# Patient Record
Sex: MALE | Race: ASIAN | NOT HISPANIC OR LATINO | Employment: FULL TIME | URBAN - METROPOLITAN AREA
[De-identification: names, ages, dates, MRNs, and addresses within clinical notes are randomized per-mention and may not be internally consistent; named-entity substitution may affect disease eponyms.]

---

## 2018-02-13 ENCOUNTER — DOCUMENTATION ONLY (OUTPATIENT)
Dept: LAB | Facility: CLINIC | Age: 28
End: 2018-02-13

## 2018-02-13 NOTE — PROGRESS NOTES
This patient has overdue labs. A letter was sent on 1/5/2018 and there has been no lab appointment made. If you still want these labs done, please have your care team contact the patient to make a lab appointment. Otherwise, please have the labs discontinued and close the encounter.    Thank you,  [facility]

## 2018-07-02 ENCOUNTER — OFFICE VISIT (OUTPATIENT)
Dept: FAMILY MEDICINE | Facility: CLINIC | Age: 28
End: 2018-07-02
Payer: COMMERCIAL

## 2018-07-02 VITALS
HEIGHT: 63 IN | TEMPERATURE: 98.2 F | SYSTOLIC BLOOD PRESSURE: 114 MMHG | BODY MASS INDEX: 23.04 KG/M2 | WEIGHT: 130 LBS | HEART RATE: 72 BPM | DIASTOLIC BLOOD PRESSURE: 68 MMHG

## 2018-07-02 DIAGNOSIS — Z13.1 SCREENING FOR DIABETES MELLITUS: ICD-10-CM

## 2018-07-02 DIAGNOSIS — Z13.220 SCREENING FOR HYPERLIPIDEMIA: ICD-10-CM

## 2018-07-02 DIAGNOSIS — Z00.00 ROUTINE HISTORY AND PHYSICAL EXAMINATION OF ADULT: Primary | ICD-10-CM

## 2018-07-02 DIAGNOSIS — Z83.3 FAMILY HISTORY OF DIABETES MELLITUS IN FATHER: ICD-10-CM

## 2018-07-02 LAB — HBA1C MFR BLD: 5.3 % (ref 0–5.6)

## 2018-07-02 PROCEDURE — 83036 HEMOGLOBIN GLYCOSYLATED A1C: CPT | Performed by: NURSE PRACTITIONER

## 2018-07-02 PROCEDURE — 99395 PREV VISIT EST AGE 18-39: CPT | Performed by: NURSE PRACTITIONER

## 2018-07-02 PROCEDURE — 80061 LIPID PANEL: CPT | Performed by: NURSE PRACTITIONER

## 2018-07-02 PROCEDURE — 36415 COLL VENOUS BLD VENIPUNCTURE: CPT | Performed by: NURSE PRACTITIONER

## 2018-07-02 NOTE — MR AVS SNAPSHOT
After Visit Summary   7/2/2018    Hitesh Andrea    MRN: [id number]           Patient Information     Date Of Birth          1990        Visit Information        Provider Department      7/2/2018 12:20 PM Shima Rivera NP [facility]        Today's Diagnoses     Routine history and physical examination of adult    -  1    Screening for hyperlipidemia        Screening for diabetes mellitus        Family history of diabetes mellitus in father          Care Instructions      Preventive Health Recommendations  Male Ages 26 - 39    Yearly exam:             See your health care provider every year in order to  o   Review health changes.   o   Discuss preventive care.    o   Review your medicines if your doctor has prescribed any.    You should be tested each year for STDs (sexually transmitted diseases), if you re at risk.     After age 35, talk to your provider about cholesterol testing. If you are at risk for heart disease, have your cholesterol tested at least every 5 years.     If you are at risk for diabetes, you should have a diabetes test (fasting glucose).  Shots: Get a flu shot each year. Get a tetanus shot every 10 years.     Nutrition:    Eat at least 5 servings of fruits and vegetables daily.     Eat whole-grain bread, whole-wheat pasta and brown rice instead of white grains and rice.     Get adequate Calcium and Vitamin D.     Lifestyle    Exercise for at least 150 minutes a week (30 minutes a day, 5 days a week). This will help you control your weight and prevent disease.     Limit alcohol to one drink per day.     No smoking.     Wear sunscreen to prevent skin cancer.     See your dentist every six months for an exam and cleaning.   [facility]   Discharged by : Verna Gates MA    Paper scripts provided to patient : no     If you have any questions regarding your visit please contact your care team:     Team Gold                Clinic Hours Telephone  Number     Dr. Lois Rojas  Shima Rivera, CNP 7am-7pm  Monday - Thursday   7am-5pm  Fridays  [phone]   (Appointment scheduling available 24/7)     RN Line  [phone] option 2     Urgent Care - [place] - 11am-9pm Monday-Friday Saturday-Sunday- 9am-5pm     [place] -   5pm-9pm Monday-Friday Saturday-Sunday- 9am-5pm    [phone] - [place]    [phone] - [place]       For a Price Quote for your services, please call our Consumer Price Line at [phone].     What options do I have for visits at the clinic other than the traditional office visit?     To expand how we care for you, many of our providers are utilizing electronic visits (e-visits) and telephone visits, when medically appropriate, for interactions with their patients rather than a visit in the clinic. We also offer nurse visits for many medical concerns. Just like any other service, we will bill your insurance company for this type of visit based on time spent on the phone with your provider. Not all insurance companies cover these visits. Please check with your medical insurance if this type of visit is covered. You will be responsible for any charges that are not paid by your insurance.   E-visits via [employer]: generally incur a $35.00 fee.     Telephone visits:  Time spent on the phone: *charged based on time that is spent on the phone in increments of 10 minutes. Estimated cost:   5-10 mins $30.00   11-20 mins. $59.00   21-30 mins. $85.00       Use Greencarthart (secure email communication and access to your chart) to send your primary care provider a message or make an appointment. Ask someone on your Team how to sign up for FIZZAt.     As always, Thank you for trusting us with your health care needs!      [facility]:    Scheduling Appointments  [facility] [place]  Call: [phone]    [facility]  Call: [phone]    [facility]  Call: [phone]    For Gastroenterology referrals   [facility]   Clinics and Surgery Center, 4th Floor   [address] [place] [address]   Appointments: [phone]    WHERE TO GO FOR CARE?  Clinic    Make an appointment if you:       Are sick (cold, cough, flu, sore throat, earache or in pain).       Have a small injury (sprain, small cut, burn or broken bone).       Need a physical exam, Pap smear, vaccine or prescription refill.       Have questions about your health or medicines.    To reach us:      Call [phone] ([phone]). Open 24 hours every day. (For counseling services, call [phone].)    Log into Lyks at LLamasoft. (Visit BView to create an account.) Hospital emergency room    An emergency is a serious or life- threatening problem that must be treated right away.    Call [phone] or get to the hospital if you have:      Very bad or sudden:            - Chest pain or pressure         - Bleeding         - Head or belly pain         - Dizziness or trouble seeing, walking or                          Speaking      Problems breathing      Blood in your vomit or you are coughing up blood      A major injury (knocked out, loss of a finger or limb, rape, broken bone protruding from skin)    A mental health crisis. (Or call the Mental Health Crisis line at 1-951.356.4129 or Suicide Prevention Hotline at 1-410.793.1703.)    Open 24 hours every day. You don't need an appointment.     Urgent care    Visit urgent care for sickness or small injuries when the clinic is closed. You don't need an appointment. To check hours or find an urgent care near you, visit [email or web address]. Online care    Get online care from [facility] for more than 70 common problems, like colds, allergies and infections. Open 24 hours every day at:   [email or web address]   Need help deciding?    For advice about  "where to be seen, you may call your clinic and ask to speak with a nurse. We're here for you 24 hours every day.         If you are deaf or hard of hearing, please let us know. We provide many free services including sign language interpreters, oral interpreters, TTYs, telephone amplifiers, note takers and written materials.                   Follow-ups after your visit        Who to contact     If you have questions or need follow up information about today's clinic visit or your schedule please contact [facility] directly at [phone].  Normal or non-critical lab and imaging results will be communicated to you by Fruition Partnershart, letter or phone within 4 business days after the clinic has received the results. If you do not hear from us within 7 days, please contact the clinic through [employer] or phone. If you have a critical or abnormal lab result, we will notify you by phone as soon as possible.  Submit refill requests through el? or call your pharmacy and they will forward the refill request to us. Please allow 3 business days for your refill to be completed.          Additional Information About Your Visit        [employer] Information     el? gives you secure access to your electronic health record. If you see a primary care provider, you can also send messages to your care team and make appointments. If you have questions, please call your primary care clinic.  If you do not have a primary care provider, please call [phone] and they will assist you.        Care EveryWhere ID     This is your Care EveryWhere ID. This could be used by other organizations to access your [place] medical records  [id number]        Your Vitals Were     Pulse Temperature Height BMI (Body Mass Index)          72 98.2  F (36.8  C) (Oral) 5' 3.25\" (1.607 m) 22.85 kg/m2         Blood Pressure from Last 3 Encounters:   07/02/18 114/68   12/30/16 120/68    Weight from Last 3 Encounters:   07/02/18 130 lb (59 kg) "   12/30/16 130 lb 3.2 oz (59.1 kg)              We Performed the Following     Hemoglobin A1c     Lipid Profile (Chol, Trig, HDL, LDL calc)        Primary Care Provider Fax #    Physician No Ref-Primary [phone]       No address on file        Equal Access to Services     RAAD HILARIA : Hadii ros robb milyo Soomaali, waaxda luqadaha, qaybta kaalmada adeegyada, camden jocelynein hayaaluís javiercammie hager dipti mccollum. So [facility] [phone].    ATENCIÓN: Si habla español, tiene a hatfield disposición servicios gratuitos de asistencia lingüística. Llame al [phone].    We comply with applicable federal civil rights laws and [place] laws. We do not discriminate on the basis of race, color, national origin, age, disability, sex, sexual orientation, or gender identity.            Thank you!     Thank you for choosing [facility]  for your care. Our goal is always to provide you with excellent care. Hearing back from our patients is one way we can continue to improve our services. Please take a few minutes to complete the written survey that you may receive in the mail after your visit with us. Thank you!             Your Updated Medication List - Protect others around you: Learn how to safely use, store and throw away your medicines at www.disposemymeds.org.          This list is accurate as of 7/2/18  1:14 PM.  Always use your most recent med list.                   Brand Name Dispense Instructions for use Diagnosis    MULTI-VITAMIN GUMMIES PO      Take by mouth daily

## 2018-07-02 NOTE — PATIENT INSTRUCTIONS
Preventive Health Recommendations  Male Ages 26 - 39    Yearly exam:             See your health care provider every year in order to  o   Review health changes.   o   Discuss preventive care.    o   Review your medicines if your doctor has prescribed any.    You should be tested each year for STDs (sexually transmitted diseases), if you re at risk.     After age 35, talk to your provider about cholesterol testing. If you are at risk for heart disease, have your cholesterol tested at least every 5 years.     If you are at risk for diabetes, you should have a diabetes test (fasting glucose).  Shots: Get a flu shot each year. Get a tetanus shot every 10 years.     Nutrition:    Eat at least 5 servings of fruits and vegetables daily.     Eat whole-grain bread, whole-wheat pasta and brown rice instead of white grains and rice.     Get adequate Calcium and Vitamin D.     Lifestyle    Exercise for at least 150 minutes a week (30 minutes a day, 5 days a week). This will help you control your weight and prevent disease.     Limit alcohol to one drink per day.     No smoking.     Wear sunscreen to prevent skin cancer.     See your dentist every six months for an exam and cleaning.   [facility]   Discharged by : Verna Gates MA    Paper scripts provided to patient : no     If you have any questions regarding your visit please contact your care team:     Team Gold                Clinic Hours Telephone Number     Dr. Lois Rivera, CNP 7am-7pm  Monday - Thursday   7am-5pm  Fridays  [phone]   (Appointment scheduling available 24/7)     RN Line  [phone] option 2     Urgent Care - [place] - 11am-9pm Monday-Friday Saturday-Sunday- 9am-5pm     [place] -   5pm-9pm Monday-Friday Saturday-Sunday- 9am-5pm    [phone] - [place]    [phone] - [place]       For a Price Quote for your services,  please call our Consumer Price Line at [phone].     What options do I have for visits at the clinic other than the traditional office visit?     To expand how we care for you, many of our providers are utilizing electronic visits (e-visits) and telephone visits, when medically appropriate, for interactions with their patients rather than a visit in the clinic. We also offer nurse visits for many medical concerns. Just like any other service, we will bill your insurance company for this type of visit based on time spent on the phone with your provider. Not all insurance companies cover these visits. Please check with your medical insurance if this type of visit is covered. You will be responsible for any charges that are not paid by your insurance.   E-visits via liveBooks: generally incur a $35.00 fee.     Telephone visits:  Time spent on the phone: *charged based on time that is spent on the phone in increments of 10 minutes. Estimated cost:   5-10 mins $30.00   11-20 mins. $59.00   21-30 mins. $85.00       Use liveBooks (secure email communication and access to your chart) to send your primary care provider a message or make an appointment. Ask someone on your Team how to sign up for liveBooks.     As always, Thank you for trusting us with your health care needs!      [facility]:    Scheduling Appointments  [place]  Call: [phone]    [facility]  Call: [phone]    [facility]  Call: [phone]    For Gastroenterology referrals   [facility]   Clinics and Surgery Center, 4th Floor   [address] [place] [address]   Appointments: [phone]    WHERE TO GO FOR CARE?  Clinic    Make an appointment if you:       Are sick (cold, cough, flu, sore throat, earache or in pain).       Have a small injury (sprain, small cut, burn or broken bone).       Need a physical exam, Pap smear, vaccine or  prescription refill.       Have questions about your health or medicines.    To reach us:      Call [phone] ([phone]). Open 24 hours every day. (For counseling services, call [phone].)    Log into farmflo at InfoLogix. (Visit [email or web address] to create an account.) Hospital emergency room    An emergency is a serious or life- threatening problem that must be treated right away.    Call 911 or get to the hospital if you have:      Very bad or sudden:            - Chest pain or pressure         - Bleeding         - Head or belly pain         - Dizziness or trouble seeing, walking or                          Speaking      Problems breathing      Blood in your vomit or you are coughing up blood      A major injury (knocked out, loss of a finger or limb, rape, broken bone protruding from skin)    A mental health crisis. (Or call the Mental Health Crisis line at 1-780.840.8123 or Suicide Prevention Hotline at 1-889.473.6915.)    Open 24 hours every day. You don't need an appointment.     Urgent care    Visit urgent care for sickness or small injuries when the clinic is closed. You don't need an appointment. To check hours or find an urgent care near you, visit [email or web address]. Online care    Get online [facility] from [facility] for more than 70 common problems, like colds, allergies and infections. Open 24 hours every day at:   [email or web address]   Need help deciding?    For advice about where to be seen, you may call your clinic and ask to speak with a nurse. We're here for you 24 hours every day.         If you are deaf or hard of hearing, please let us know. We provide many free services including sign language interpreters, oral interpreters, TTYs, telephone amplifiers, note takers and written materials.

## 2018-07-02 NOTE — PROGRESS NOTES
SUBJECTIVE:   CC: Hitesh Andrea is an 27 year old male who presents for preventative health visit.     Physical   Annual:     Getting at least 3 servings of Calcium per day:  NO    Bi-annual eye exam:  Yes    Dental care twice a year:  NO    Sleep apnea or symptoms of sleep apnea:  None    Diet:  Breakfast skipped    Frequency of exercise:  6-7 days/week    Duration of exercise:  45-60 minutes    Taking medications regularly:  Yes    Medication side effects:  None    Additional concerns today:  No    Lifts weights and runs for exercise.    Thinning hair. Noticed when he increased working out and exercise.  No bald spots.    Finished school, master's in educational leadership.  Works in employment counseling for 1 year.    Diet:  Meat is mainly chicken.  Beef occasional.  Eats twice daily.  Meal replacement drinks in between meals.  Not much rice or fruits.    Today's PHQ-2 Score:   PHQ-2 ( 1999 Pfizer) 7/2/2018   Q1: Little interest or pleasure in doing things 0   Q2: Feeling down, depressed or hopeless 0   PHQ-2 Score 0   Q1: Little interest or pleasure in doing things Not at all   Q2: Feeling down, depressed or hopeless Not at all   PHQ-2 Score 0       Abuse: Current or Past(Physical, Sexual or Emotional)- No  Do you feel safe in your environment - Yes    Social History   Substance Use Topics     Smoking status: Never Smoker     Smokeless tobacco: Never Used     Alcohol use 0.0 oz/week     0 Standard drinks or equivalent per week      Comment: socailly     Alcohol Use 7/2/2018   If you drink alcohol do you typically have greater than 3 drinks per day OR greater than 7 drinks per week? No   No flowsheet data found.    Last PSA: No results found for: PSA    Reviewed orders with patient. Reviewed health maintenance and updated orders accordingly - Yes  BP Readings from Last 3 Encounters:   07/02/18 114/68   12/30/16 120/68    Wt Readings from Last 3 Encounters:   07/02/18 130 lb (59 kg)   12/30/16 130 lb 3.2 oz (59.1 kg)  "                 Patient Active Problem List   Diagnosis     Family history of diabetes mellitus in father     Past Surgical History:   Procedure Laterality Date     eye surgery Bilateral     Lasik eye surgery       Social History   Substance Use Topics     Smoking status: Never Smoker     Smokeless tobacco: Never Used     Alcohol use 0.0 oz/week     0 Standard drinks or equivalent per week      Comment: socailly     Family History   Problem Relation Age of Onset     Mental Illness Mother      Diabetes Father 60     No Known Problems Brother      No Known Problems Sister      No Known Problems Brother      No Known Problems Sister      HEART DISEASE No family hx of          Current Outpatient Prescriptions   Medication Sig Dispense Refill     Multiple Vitamins-Minerals (MULTI-VITAMIN GUMMIES PO) Take by mouth daily        No Known Allergies    Reviewed and updated as needed this visit by clinical staff  Tobacco  Allergies  Meds  Problems  Med Hx  Surg Hx  Fam Hx  Soc Hx          Reviewed and updated as needed this visit by Provider  Allergies  Meds  Problems            Review of Systems  CONSTITUTIONAL: NEGATIVE for fever, chills, change in weight  INTEGUMENTARY/SKIN: hair loss  EYES: NEGATIVE for vision changes or irritation  ENT: NEGATIVE for ear, mouth and throat problems  RESP: NEGATIVE for significant cough or SOB  CV: NEGATIVE for chest pain, palpitations or peripheral edema  GI: NEGATIVE for nausea, abdominal pain, heartburn, or change in bowel habits   male: negative for dysuria, hematuria, decreased urinary stream, erectile dysfunction, urethral discharge  MUSCULOSKELETAL: NEGATIVE for significant arthralgias or myalgia  NEURO: NEGATIVE for weakness, dizziness or paresthesias  PSYCHIATRIC: NEGATIVE for changes in mood or affect    OBJECTIVE:   /68  Pulse 72  Temp 98.2  F (36.8  C) (Oral)  Ht 5' 3.25\" (1.607 m)  Wt 130 lb (59 kg)  BMI 22.85 kg/m2    Physical Exam  GENERAL: healthy, " "alert and no distress  EYES: Eyes grossly normal to inspection, PERRL and conjunctivae and sclerae normal  HENT: ear canals and TM's normal, nose and mouth without ulcers or lesions  NECK: no adenopathy, no asymmetry, masses, or scars and thyroid normal to palpation  RESP: lungs clear to auscultation - no rales, rhonchi or wheezes  CV: regular rate and rhythm, normal S1 S2, no S3 or S4, no murmur, click or rub, no peripheral edema and peripheral pulses strong  ABDOMEN: soft, nontender, no hepatosplenomegaly, no masses and bowel sounds normal   (male): declined  MS: no gross musculoskeletal defects noted, no edema  SKIN: no suspicious lesions or rashes  NEURO: Normal strength and tone, mentation intact and speech normal  PSYCH: mentation appears normal, affect normal/bright    Diagnostic Test Results:  Results for orders placed or performed in visit on 07/02/18   Lipid Profile (Chol, Trig, HDL, LDL calc)   Result Value Ref Range    Cholesterol 171 <200 mg/dL    Triglycerides 93 <150 mg/dL    HDL Cholesterol 77 >39 mg/dL    LDL Cholesterol Calculated 75 <100 mg/dL    Non HDL Cholesterol 94 <130 mg/dL   Hemoglobin A1c   Result Value Ref Range    Hemoglobin A1C 5.3 0 - 5.6 %       ASSESSMENT/PLAN:   1. Routine history and physical examination of adult    2. Screening for hyperlipidemia    - Lipid Profile (Chol, Trig, HDL, LDL calc)    3. Screening for diabetes mellitus    - Hemoglobin A1c    4. Family history of diabetes mellitus in father        COUNSELING:   Reviewed preventive health counseling, as reflected in patient instructions       Regular exercise       Healthy diet/nutrition    BP Readings from Last 1 Encounters:   07/02/18 114/68     Estimated body mass index is 22.85 kg/(m^2) as calculated from the following:    Height as of this encounter: 5' 3.25\" (1.607 m).    Weight as of this encounter: 130 lb (59 kg).           reports that he has never smoked. He has never used smokeless tobacco.      Counseling " Resources:  ATP IV Guidelines  Pooled Cohorts Equation Calculator  FRAX Risk Assessment  ICSI Preventive Guidelines  Dietary Guidelines for Americans, 2010  USDA's MyPlate  ASA Prophylaxis  Lung CA Screening    Shima Rivera NP  [facility]

## 2018-07-03 LAB
CHOLEST SERPL-MCNC: 171 MG/DL
HDLC SERPL-MCNC: 77 MG/DL
LDLC SERPL CALC-MCNC: 75 MG/DL
NONHDLC SERPL-MCNC: 94 MG/DL
TRIGL SERPL-MCNC: 93 MG/DL

## 2018-10-02 ENCOUNTER — OFFICE VISIT (OUTPATIENT)
Dept: FAMILY MEDICINE | Facility: CLINIC | Age: 28
End: 2018-10-02

## 2018-10-02 VITALS
HEART RATE: 68 BPM | SYSTOLIC BLOOD PRESSURE: 110 MMHG | DIASTOLIC BLOOD PRESSURE: 64 MMHG | BODY MASS INDEX: 24.63 KG/M2 | TEMPERATURE: 98.4 F | HEIGHT: 63 IN | WEIGHT: 139 LBS

## 2018-10-02 DIAGNOSIS — H57.89 IRRITATION OF LEFT EYE: Primary | ICD-10-CM

## 2018-10-02 DIAGNOSIS — Z23 NEED FOR PROPHYLACTIC VACCINATION AND INOCULATION AGAINST INFLUENZA: ICD-10-CM

## 2018-10-02 PROCEDURE — 99213 OFFICE O/P EST LOW 20 MIN: CPT | Mod: 25 | Performed by: PHYSICIAN ASSISTANT

## 2018-10-02 PROCEDURE — 90471 IMMUNIZATION ADMIN: CPT | Performed by: PHYSICIAN ASSISTANT

## 2018-10-02 PROCEDURE — 90686 IIV4 VACC NO PRSV 0.5 ML IM: CPT | Performed by: PHYSICIAN ASSISTANT

## 2018-10-02 RX ORDER — TOBRAMYCIN 3 MG/ML
1 SOLUTION/ DROPS OPHTHALMIC EVERY 4 HOURS
Qty: 1 BOTTLE | Refills: 0 | Status: SHIPPED | OUTPATIENT
Start: 2018-10-02 | End: 2018-10-09

## 2018-10-02 NOTE — MR AVS SNAPSHOT
After Visit Summary   10/2/2018    Hitesh Andrea    MRN: [id number]           Patient Information     Date Of Birth          1990        Visit Information        Provider Department      10/2/2018 11:20 AM Isauro Reyna PA-C [facility]        Today's Diagnoses     Irritation of left eye    -  1    Need for prophylactic vaccination and inoculation against influenza          Care Instructions    1. Do warm/hot packing twice a day x a few days  2. Topical antibiotic   3. Contact us Thursday or Friday if persisting           Follow-ups after your visit        Who to contact     If you have questions or need follow up information about today's clinic visit or your schedule please contact [facility] directly at [phone].  Normal or non-critical lab and imaging results will be communicated to you by [facility], letter or phone within 4 business days after the clinic has received the results. If you do not hear from us within 7 days, please contact the clinic through [facility] or phone. If you have a critical or abnormal lab result, we will notify you by phone as soon as possible.  Submit refill requests through [facility] or call your pharmacy and they will forward the refill request to us. Please allow 3 business days for your refill to be completed.          Additional Information About Your Visit        MyChart Information     [employer] gives you secure access to your electronic health record. If you see a primary care provider, you can also send messages to your care team and make appointments. If you have questions, please call your primary care clinic.  If you do not have a primary care provider, please call [phone] and they will assist you.        Care EveryWhere ID     This is your Care EveryWhere ID. This could be used by other organizations to access your [facility] medical records  [id number]        Your Vitals Were     Pulse Temperature Height BMI (Body  "Mass Index)          68 98.4  F (36.9  C) (Oral) 5' 3.25\" (1.607 m) 24.43 kg/m2         Blood Pressure from Last 3 Encounters:   10/02/18 110/64   07/02/18 114/68   12/30/16 120/68    Weight from Last 3 Encounters:   10/02/18 139 lb (63 kg)   07/02/18 130 lb (59 kg)   12/30/16 130 lb 3.2 oz (59.1 kg)              We Performed the Following     FLU VACCINE, SPLIT VIRUS, IM (QUADRIVALENT) [27986]- >3 YRS     Vaccine Administration, Initial [70973]          Today's Medication Changes          These changes are accurate as of 10/2/18 11:49 AM.  If you have any questions, ask your nurse or doctor.               Start taking these medicines.        Dose/Directions    tobramycin 0.3 % ophthalmic solution   Commonly known as:  TOBREX   Used for:  Irritation of left eye   Started by:  Isauro Reyna PA-C        Dose:  1 drop   Apply 1 drop to eye every 4 hours for 7 days   Quantity:  1 Bottle   Refills:  0            Where to get your medicines      These medications were sent to [facility] [address] [place] [id number]     Phone:  [phone]     tobramycin 0.3 % ophthalmic solution                Primary Care Provider Fax #    Physician No Ref-Primary [phone]       No address on file        Equal Access to Services     RAAD MANRIQUE AH: Hadii ros minoro Sodai, waaxda luqadaha, qaybta kaalmada adeegyada, camden mccollum. So [facility] [phone].    ATENCIÓN: Si habla español, tiene a hatfield disposición servicios gratuitos de asistencia lingüística. Llame al [phone].    We comply with applicable federal civil rights laws and [place] laws. We do not discriminate on the basis of race, color, national origin, age, disability, sex, sexual orientation, or gender identity.            Thank you!     Thank you for choosing [facility]  for your care. Our goal is always to provide you with excellent care. " Hearing back from our patients is one way we can continue to improve our services. Please take a few minutes to complete the written survey that you may receive in the mail after your visit with us. Thank you!             Your Updated Medication List - Protect others around you: Learn how to safely use, store and throw away your medicines at www.disposemymeds.org.          This list is accurate as of 10/2/18 11:49 AM.  Always use your most recent med list.                   Brand Name Dispense Instructions for use Diagnosis    MULTI-VITAMIN GUMMIES PO      Take by mouth daily        tobramycin 0.3 % ophthalmic solution    TOBREX    1 Bottle    Apply 1 drop to eye every 4 hours for 7 days    Irritation of left eye

## 2018-10-02 NOTE — PROGRESS NOTES
"  SUBJECTIVE:   Hitesh Andrea is a 28 year old male who presents to clinic today for the following health issues:    Eye(s) Problem  Onset: 3 days     Description:   Location: left   Pain: no - he can feel something under his eye lid. When he closes his eyes, he can feel it more.   Redness: YES    Accompanying Signs & Symptoms:  Discharge/mattering: no   Swelling: no   Visual changes: no   Fever: no   Nasal Congestion: no   Bothered by bright lights: no     History:   Trauma: no   Foreign body exposure: no     Precipitating factors:   Wearing contacts: no    Alleviating factors:  Improved by: None     Therapies Tried and outcome: None     Denies a known foreign body. Reports mild irritation but no pain or vision changes. He's tried flushing the eye.     Problem list and histories reviewed & adjusted, as indicated.  Additional history: as documented    Labs reviewed in EPIC    Reviewed and updated as needed this visit by clinical staff       Reviewed and updated as needed this visit by Provider         ROS:  Constitutional, HEENT, cardiovascular, pulmonary, gi and gu systems are negative, except as otherwise noted.    OBJECTIVE:     /64 (Cuff Size: Adult Regular)  Pulse 68  Temp 98.4  F (36.9  C) (Oral)  Ht 5' 3.25\" (1.607 m)  Wt 139 lb (63 kg)  BMI 24.43 kg/m2  Body mass index is 24.43 kg/(m^2).  GENERAL: healthy, alert and no distress  EYES: EOMS are intact and not painful. Has mild conjunctival erythema. He has no foreign body. Topical application of proparacaine results in resolved discomfort. Fluorescein staining is normal without corneal injury found.     ASSESSMENT/PLAN:       ICD-10-CM    1. Irritation of left eye H57.89 tobramycin (TOBREX) 0.3 % ophthalmic solution   2. Need for prophylactic vaccination and inoculation against influenza Z23 FLU VACCINE, SPLIT VIRUS, IM (QUADRIVALENT) [76151]- >3 YRS     Vaccine Administration, Initial [49227]      Exam is reassuring. He seems to possibly have an upper " eyelid stye based on his description though his exam is really quite reassuring.    Reviewed options - will treat with topical antibiotics, hot packing and he can contact me in a few days if getting worse / not improving.     VINCENT SHANKAR PA-C  [facility]    Injectable Influenza Immunization Documentation    1.  Is the person to be vaccinated sick today?   No    2. Does the person to be vaccinated have an allergy to a component   of the vaccine?   No  Egg Allergy Algorithm Link    3. Has the person to be vaccinated ever had a serious reaction   to influenza vaccine in the past?   No    4. Has the person to be vaccinated ever had Guillain-Barré syndrome?   No    Form completed by Merari Parikh CMA (AAMA)

## 2018-10-02 NOTE — PATIENT INSTRUCTIONS
1. Do warm/hot packing twice a day x a few days  2. Topical antibiotic   3. Contact us Thursday or Friday if persisting

## 2018-10-02 NOTE — NURSING NOTE
Prior to injection verified patient identity using patient's name and date of birth.  Due to injection administration, patient instructed to remain in clinic for 15 minutes  afterwards, and to report any adverse reaction to me immediately.    Merari Parikh CMA ([facility])

## 2020-02-10 ENCOUNTER — HEALTH MAINTENANCE LETTER (OUTPATIENT)
Age: 30
End: 2020-02-10

## 2020-11-14 ENCOUNTER — HEALTH MAINTENANCE LETTER (OUTPATIENT)
Age: 30
End: 2020-11-14

## 2021-03-28 ENCOUNTER — HEALTH MAINTENANCE LETTER (OUTPATIENT)
Age: 31
End: 2021-03-28

## 2021-09-12 ENCOUNTER — HEALTH MAINTENANCE LETTER (OUTPATIENT)
Age: 31
End: 2021-09-12

## 2022-04-24 ENCOUNTER — HEALTH MAINTENANCE LETTER (OUTPATIENT)
Age: 32
End: 2022-04-24

## 2022-09-29 ENCOUNTER — NURSE TRIAGE (OUTPATIENT)
Dept: NURSING | Facility: CLINIC | Age: 32
End: 2022-09-29

## 2022-09-29 NOTE — TELEPHONE ENCOUNTER
"Pt reports \"last night at a concert, [place], fell down, heard a popping noise, knew something was wrong, now knee swollen and hard to stand up straight\". Pt reports he is unable to bear weight on both feet.    Advised pt to be seen in clinic today, ice and elevate in the meantime, have another adult drive.     Pt verbalizes understanding and agrees to plan.     Reason for Disposition    Can't stand (bear weight) or walk    Additional Information    Negative: Major bleeding (actively dripping or spurting) that can't be stopped    Negative: Bullet, stabbed by knife or other serious penetrating wound    Negative: Looks like a dislocated joint (crooked or deformed)    Negative: Serious injury with multiple fractures (broken bones)    Negative: Sounds like a life-threatening emergency to the triager    Negative: Wound looks infected    Protocols used: KNEE INJURY-A-OH      "

## 2022-11-19 ENCOUNTER — HEALTH MAINTENANCE LETTER (OUTPATIENT)
Age: 32
End: 2022-11-19

## 2023-06-01 ENCOUNTER — HEALTH MAINTENANCE LETTER (OUTPATIENT)
Age: 33
End: 2023-06-01

## 2023-11-17 ENCOUNTER — APPOINTMENT (OUTPATIENT)
Dept: CT IMAGING | Facility: HOSPITAL | Age: 33
End: 2023-11-17
Attending: EMERGENCY MEDICINE
Payer: COMMERCIAL

## 2023-11-17 ENCOUNTER — HOSPITAL ENCOUNTER (EMERGENCY)
Facility: HOSPITAL | Age: 33
Discharge: HOME | End: 2023-11-18
Attending: EMERGENCY MEDICINE | Admitting: EMERGENCY MEDICINE
Payer: COMMERCIAL

## 2023-11-17 ENCOUNTER — APPOINTMENT (OUTPATIENT)
Dept: RADIOLOGY | Facility: HOSPITAL | Age: 33
End: 2023-11-17
Attending: EMERGENCY MEDICINE
Payer: COMMERCIAL

## 2023-11-17 VITALS
OXYGEN SATURATION: 100 % | SYSTOLIC BLOOD PRESSURE: 114 MMHG | HEIGHT: 64 IN | RESPIRATION RATE: 18 BRPM | HEART RATE: 90 BPM | WEIGHT: 140.1 LBS | BODY MASS INDEX: 23.92 KG/M2 | DIASTOLIC BLOOD PRESSURE: 55 MMHG | TEMPERATURE: 98 F

## 2023-11-17 DIAGNOSIS — S01.01XA SCALP LACERATION, INITIAL ENCOUNTER: ICD-10-CM

## 2023-11-17 DIAGNOSIS — R40.20 LOSS OF CONSCIOUSNESS (H): ICD-10-CM

## 2023-11-17 DIAGNOSIS — S01.81XA CHIN LACERATION, INITIAL ENCOUNTER: ICD-10-CM

## 2023-11-17 DIAGNOSIS — F10.920 ALCOHOLIC INTOXICATION WITHOUT COMPLICATION (H): ICD-10-CM

## 2023-11-17 DIAGNOSIS — S09.90XA INJURY OF HEAD, INITIAL ENCOUNTER: ICD-10-CM

## 2023-11-17 LAB
ANION GAP SERPL CALCULATED.3IONS-SCNC: 21 MMOL/L (ref 7–15)
BASOPHILS # BLD AUTO: 0.1 10E3/UL (ref 0–0.2)
BASOPHILS NFR BLD AUTO: 0 %
BUN SERPL-MCNC: 19.6 MG/DL (ref 6–20)
CALCIUM SERPL-MCNC: 9.8 MG/DL (ref 8.6–10)
CHLORIDE SERPL-SCNC: 100 MMOL/L (ref 98–107)
CREAT SERPL-MCNC: 1.04 MG/DL (ref 0.67–1.17)
D DIMER PPP FEU-MCNC: <0.27 UG/ML FEU (ref 0–0.5)
DEPRECATED HCO3 PLAS-SCNC: 20 MMOL/L (ref 22–29)
EGFRCR SERPLBLD CKD-EPI 2021: >90 ML/MIN/1.73M2
EOSINOPHIL # BLD AUTO: 0.1 10E3/UL (ref 0–0.7)
EOSINOPHIL NFR BLD AUTO: 1 %
ERYTHROCYTE [DISTWIDTH] IN BLOOD BY AUTOMATED COUNT: 13.2 % (ref 10–15)
ETHANOL SERPL-MCNC: 0.17 G/DL
GLUCOSE SERPL-MCNC: 110 MG/DL (ref 70–99)
HCT VFR BLD AUTO: 47.8 % (ref 40–53)
HGB BLD-MCNC: 16.3 G/DL (ref 13.3–17.7)
HOLD SPECIMEN: NORMAL
HOLD SPECIMEN: NORMAL
IMM GRANULOCYTES # BLD: 0.1 10E3/UL
IMM GRANULOCYTES NFR BLD: 1 %
LYMPHOCYTES # BLD AUTO: 4.7 10E3/UL (ref 0.8–5.3)
LYMPHOCYTES NFR BLD AUTO: 27 %
MCH RBC QN AUTO: 29.6 PG (ref 26.5–33)
MCHC RBC AUTO-ENTMCNC: 34.1 G/DL (ref 31.5–36.5)
MCV RBC AUTO: 87 FL (ref 78–100)
MONOCYTES # BLD AUTO: 0.9 10E3/UL (ref 0–1.3)
MONOCYTES NFR BLD AUTO: 5 %
NEUTROPHILS # BLD AUTO: 11.4 10E3/UL (ref 1.6–8.3)
NEUTROPHILS NFR BLD AUTO: 66 %
NRBC # BLD AUTO: 0 10E3/UL
NRBC BLD AUTO-RTO: 0 /100
PLATELET # BLD AUTO: 222 10E3/UL (ref 150–450)
POTASSIUM SERPL-SCNC: 3 MMOL/L (ref 3.4–5.3)
RBC # BLD AUTO: 5.51 10E6/UL (ref 4.4–5.9)
SODIUM SERPL-SCNC: 141 MMOL/L (ref 135–145)
TROPONIN T SERPL HS-MCNC: 7 NG/L
WBC # BLD AUTO: 17.3 10E3/UL (ref 4–11)

## 2023-11-17 PROCEDURE — 71046 X-RAY EXAM CHEST 2 VIEWS: CPT

## 2023-11-17 PROCEDURE — 36415 COLL VENOUS BLD VENIPUNCTURE: CPT | Performed by: EMERGENCY MEDICINE

## 2023-11-17 PROCEDURE — 12011 RPR F/E/E/N/L/M 2.5 CM/<: CPT

## 2023-11-17 PROCEDURE — 99285 EMERGENCY DEPT VISIT HI MDM: CPT | Mod: 25

## 2023-11-17 PROCEDURE — 96360 HYDRATION IV INFUSION INIT: CPT

## 2023-11-17 PROCEDURE — 85025 COMPLETE CBC W/AUTO DIFF WBC: CPT | Performed by: EMERGENCY MEDICINE

## 2023-11-17 PROCEDURE — 85379 FIBRIN DEGRADATION QUANT: CPT | Performed by: EMERGENCY MEDICINE

## 2023-11-17 PROCEDURE — 250N000011 HC RX IP 250 OP 636: Performed by: EMERGENCY MEDICINE

## 2023-11-17 PROCEDURE — 258N000003 HC RX IP 258 OP 636: Performed by: EMERGENCY MEDICINE

## 2023-11-17 PROCEDURE — 72125 CT NECK SPINE W/O DYE: CPT

## 2023-11-17 PROCEDURE — 90471 IMMUNIZATION ADMIN: CPT | Performed by: EMERGENCY MEDICINE

## 2023-11-17 PROCEDURE — 70450 CT HEAD/BRAIN W/O DYE: CPT

## 2023-11-17 PROCEDURE — 93005 ELECTROCARDIOGRAM TRACING: CPT | Performed by: EMERGENCY MEDICINE

## 2023-11-17 PROCEDURE — 12001 RPR S/N/AX/GEN/TRNK 2.5CM/<: CPT

## 2023-11-17 PROCEDURE — 90715 TDAP VACCINE 7 YRS/> IM: CPT | Performed by: EMERGENCY MEDICINE

## 2023-11-17 PROCEDURE — 82077 ASSAY SPEC XCP UR&BREATH IA: CPT | Performed by: EMERGENCY MEDICINE

## 2023-11-17 PROCEDURE — 80048 BASIC METABOLIC PNL TOTAL CA: CPT | Performed by: EMERGENCY MEDICINE

## 2023-11-17 PROCEDURE — 84484 ASSAY OF TROPONIN QUANT: CPT | Performed by: EMERGENCY MEDICINE

## 2023-11-17 PROCEDURE — 96361 HYDRATE IV INFUSION ADD-ON: CPT

## 2023-11-17 RX ORDER — POTASSIUM CHLORIDE 1500 MG/1
40 TABLET, EXTENDED RELEASE ORAL ONCE
Status: COMPLETED | OUTPATIENT
Start: 2023-11-17 | End: 2023-11-18

## 2023-11-17 RX ADMIN — CLOSTRIDIUM TETANI TOXOID ANTIGEN (FORMALDEHYDE INACTIVATED), CORYNEBACTERIUM DIPHTHERIAE TOXOID ANTIGEN (FORMALDEHYDE INACTIVATED), BORDETELLA PERTUSSIS TOXOID ANTIGEN (GLUTARALDEHYDE INACTIVATED), BORDETELLA PERTUSSIS FILAMENTOUS HEMAGGLUTININ ANTIGEN (FORMALDEHYDE INACTIVATED), BORDETELLA PERTUSSIS PERTACTIN ANTIGEN, AND BORDETELLA PERTUSSIS FIMBRIAE 2/3 ANTIGEN 0.5 ML: 5; 2; 2.5; 5; 3; 5 INJECTION, SUSPENSION INTRAMUSCULAR at 22:21

## 2023-11-17 RX ADMIN — SODIUM CHLORIDE 1000 ML: 9 INJECTION, SOLUTION INTRAVENOUS at 22:21

## 2023-11-17 ASSESSMENT — ACTIVITIES OF DAILY LIVING (ADL): ADLS_ACUITY_SCORE: 35

## 2023-11-18 PROCEDURE — 250N000013 HC RX MED GY IP 250 OP 250 PS 637: Performed by: EMERGENCY MEDICINE

## 2023-11-18 RX ADMIN — POTASSIUM CHLORIDE 40 MEQ: 1500 TABLET, EXTENDED RELEASE ORAL at 01:12

## 2023-11-18 ASSESSMENT — ACTIVITIES OF DAILY LIVING (ADL): ADLS_ACUITY_SCORE: 35

## 2023-11-18 NOTE — ED TRIAGE NOTES
Pt was at bar with friend. Had 3 beers. Pt had syncopal episode in bathroom. Has lac on back of head. Denies back or neck pain. Denies n/v, n/t or dizziness. Is not on thinners.     Triage Assessment (Adult)       Row Name 11/17/23 8406          Triage Assessment    Airway WDL WDL

## 2023-11-18 NOTE — DISCHARGE INSTRUCTIONS
Follow-up with your primary care doctor in 7 days to have the staples and sutures removed.  You can also go to an urgent care or back to the ER if you have to to have these removed.  You should still follow-up with your primary care doctor to discuss the event that happened and the loss of consciousness.  They may do further testing as an outpatient.  Avoid alcohol and any activities that could give you another concussion.  Do suspect you likely have a concussion from this fall.  Concussion symptoms include headaches, nausea, difficulty concentrating.  Avoid activities that exacerbate your symptoms.  Can take Tylenol and ibuprofen for pain at home.  Eat normally and stay well-hydrated.    Return to the ER if you develop severe worsening headache, loss of consciousness, chest pain, shortness of breath, confusion, numbness, weakness, or signs of infection of the wounds including drainage of pus, surrounding redness, or any new or worsening concerns.

## 2023-11-18 NOTE — ED PROVIDER NOTES
"EMERGENCY DEPARTMENT ENCOUNTER      NAME: Hitesh Andrea  AGE: 33 year old male  YOB: 1990  MRN: [id number]  EVALUATION DATE & TIME: 11/17/2023  9:20 PM    PCP: No Ref-Primary, Physician    ED PROVIDER: Judith Aragon MD      Chief Complaint   Patient presents with    Loss of Consciousness         FINAL IMPRESSION:  1. Injury of head, initial encounter    2. Loss of consciousness (H)    3. Scalp laceration, initial encounter    4. Chin laceration, initial encounter    5. Alcoholic intoxication without complication (H24)          ED COURSE & MEDICAL DECISION MAKING:    Pertinent Labs & Imaging studies reviewed. (See chart for details)    9:30 PM I introduced myself to the patient, obtained patient history, performed a physical exam, and discussed plan for ED workup including potential diagnostic laboratory/imaging studies and interventions.    We discussed the plan for discharge and the patient is agreeable. Reviewed supportive cares, symptomatic treatment, outpatient follow up, and reasons to return to the Emergency Department. Patient to be discharged by ED RN.     33 year old male who is otherwise healthy presents to the Emergency Department for evaluation of an episode of loss of consciousness in the setting of alcohol use. On exam, he appears slightly intoxicated but is awake and alert and answering questions appropriately. He is unsure of the events that occurred other than he lost consciousness in the bathroom. His alcohol level is 0.17 and he reports he has not eaten or hydrated otherwise today. Differential diagnosis for the episode includes but is not limited to vasovagal syncope, fall related to intoxication, cardiac arrhythmia, PE, seizure, etc.  Feel that seizure is less likely as likely the \"shaking\" reported was myoclonic jerking related to syncope as he has no history of seizures, was not post ictal, and has no tongue bite or loss of bowel or bladder control. EKG was obtained and reveals " sinus rhythm, no sign of WPW, LVH, or brudaga syndrome. There is somewhat of a rightward axis. No signs of acute ischemia. Will obtain d dimer as otherwise low risk wells score to eval for PE. This was negative making PE unlikely. CT of the head and cervical spine obtained as well as CXR. No sign of cardiac arrhythmia on monitoring for multiple hours here and he is feel well. Troponin is within normal limits with EKG with no sign of acute ischemia and no cardiac risk factors making ACS unlikely. BMP largely unremarkable with potassium slightly low at 3 and this was replaced with 40 mEq of potassium chloride tablet. He was given 1000 ml IV fluids and tdap was updated. Lacerations repaired as documented below. WBC is 17.3 however likely stress de margination related to fall. Hemoglobin 16.3. CXR unremarkable. CT head shows no acute intracranial hemorrhage or calvarial fracture. Cervical spine CT unremarkable. He is hemodynamically within normal limits and well appearing. He was able to eat and drink here. Do suspect either vasovagal syncope or fall with subsequent loss of consciousness with his intoxication. He would like to be discharged at this time and we did discuss the importance of close outpatient follow up for possible further testing related to the episode. Did discuss he may have a concussion and the symptoms related to this. Dicussed removal of staples and sutures in 7 days and monitoring for signs of infection. Given strict return precautions to the ER. He voiced understanding and was comfortable with this plan. He was discharged in stable condition with his friend.     ED Course as of 12/07/23 0931   Sat Nov 18, 2023   0021 Placed 5 total staples, 2 in upper wound and 3 in lower wound. 3 5-0 prolene sutures in chin laceration.        At the conclusion of the encounter I discussed the results of all of the tests and the disposition. The questions were answered. The patient or family acknowledged  "understanding and was agreeable with the care plan.       Medical Decision Making    History:  Supplemental history from: Documented in chart, if applicable  External Record(s) reviewed: Documented in chart, if applicable.    Work Up:  Chart documentation includes differential considered and any EKGs or imaging independently interpreted by provider.  In additional to work up documented, I considered the following work up: Documented in chart, if applicable.    External consultation:  Discussion of management with another provider: Documented in chart, if applicable    Complicating factors:  Care impacted by chronic illness: N/A  Care affected by social determinants of health: N/A    Disposition considerations: Discharge. No recommendations on prescription strength medication(s). See documentation for any additional details.      MEDICATIONS GIVEN IN THE EMERGENCY:  Medications   sodium chloride 0.9% BOLUS 1,000 mL (0 mLs Intravenous Stopped 11/18/23 0000)   Tdap (tetanus-diphtheria-acell pertussis) (ADACEL) injection 0.5 mL (0.5 mLs Intramuscular $Given 11/17/23 2221)   potassium chloride ER (KLOR-CON M) CR tablet 40 mEq (40 mEq Oral $Given 11/18/23 0112)       NEW PRESCRIPTIONS STARTED AT TODAY'S ER VISIT  Discharge Medication List as of 11/18/2023  1:13 AM             =================================================================    HPI    Patient information was obtained from: patient    Use of : N/A        Hitesh Andrea is a 33 year old male with no pertinent history who presents to this ED for evaluation of loss of consciousness. The patient was at the bar with his friend when he states he had 3 drinks without eating any food prior. The patient went into the bathroom and then lost consciousness. He reports he is unsure what occurred to cause this. He denies feeling light headed before hand and is uncertain if he fell prior to the loss of consciousness. He reports a bystander stated he was \"shaking\" for " a few seconds however he did not bite his tongue or lose control of his bowel or bladder. No history of seizures. No history of syncope. No family history of cardiac disease or sudden death. No history of DVT or PE. No recent travel. No leg swelling. He states he was immediately awake and acting normally. No personal history of cardiac issues. The patient has 2 lacerations on his posterior head as well as a laceration to his chin. No history of arrhythmias. No chest pain or shortness of breath prior to the fall. Denies history of medical problems, chest pain, shortness of breath, palpitations, numbness, tingling, weakness, neck pain, nausea, vomiting, diarrhea, abdominal pain, back pain, arm pain, leg pain, biting tongue, incontinence, confusion, or any other complaints at this time.    Per [facility],  1/28/11 Last Tdap.    REVIEW OF SYSTEMS   Review of Systems   Pertinent positives and negatives are documented in the HPI. All other systems reviewed and are negative.      PAST MEDICAL HISTORY:  No past medical history on file.    PAST SURGICAL HISTORY:  Past Surgical History:   Procedure Laterality Date    eye surgery Bilateral     Lasik eye surgery           CURRENT MEDICATIONS:    Multiple Vitamins-Minerals (MULTI-VITAMIN GUMMIES PO)        ALLERGIES:  No Known Allergies    FAMILY HISTORY:  Family History   Problem Relation Age of Onset    Mental Illness Mother     Diabetes Father 60    No Known Problems Brother     No Known Problems Sister     No Known Problems Brother     No Known Problems Sister     Heart Disease No family hx of        SOCIAL HISTORY:   Social History     Socioeconomic History    Marital status: Single   Tobacco Use    Smoking status: Never    Smokeless tobacco: Never   Substance and Sexual Activity    Alcohol use: Yes     Alcohol/week: 0.0 standard drinks of alcohol     Comment: socailly    Drug use: No    Sexual activity: Not Currently     Partners: Female       VITALS:  /55   Pulse 90    "Temp 98  F (36.7  C) (Oral)   Resp 18   Ht 1.626 m (5' 4\")   Wt 63.5 kg (140 lb 1.6 oz)   SpO2 100%   BMI 24.05 kg/m      PHYSICAL EXAM    Physical Exam  Constitutional: appears slightly intoxicated, NAD, GCS 15  HENT: Normocephalic, Left posterior scalp hematoma with lacerations as below, No tenderness to palpation of the facial bones, midface is stable, no epistaxis, no otorrhea or rhinorrhea, no hemotympanum or septal hematoma bilaterally, no intraoral trauma. Bilateral external ears normal, Oropharynx normal, no tongue bite, mucous membranes moist, Nose normal. Neck- Normal range of motion, No cervical spine tenderness, Supple, No stridor.    Eyes: PERRL, EOMI, Conjunctiva normal, No discharge.   Respiratory: Normal breath sounds, No respiratory distress, No wheezing or crackles, Speaks in full sentences easily.    Cardiovascular: Normal heart rate, Regular rhythm, No murmurs, No rubs, No gallops. 2+ radial pulses bilaterally. No reproducible chest tenderness.    GI: Bowel sounds normal, Soft, No tenderness, No masses, No rebound or guarding. No CVA tenderness bilaterally   Musculoskeletal: 2+ DP pulses. No notable lower extremity edema. No cyanosis, No clubbing. Good range of motion in all major joints. No tenderness to palpation or major deformities noted. No tenderness of the CTLS spine. Pelvis stable and non tender to compression.   Integument: Warm, Dry, 2 cm liner slightly gaping laceration under the chin on the left. 2 cm and 3 cm linear laceration to the posterior left scalp that does not violate the galea.   Neurologic: Alert & oriented x 3, 5/5 strength in all 4 extremities bilaterally. Sensation intact to light touch in all 4 extremities and the face bilaterally. No focal deficits noted. Normal gait. CN 2-12 intact. No visual field deficits. Finger to nose intact bilaterally. No pronator drift.     Psychiatric: Affect normal, Judgment normal, Mood normal. Cooperative.      LAB:  All pertinent " labs reviewed and interpreted.  Results for orders placed or performed during the hospital encounter of 11/17/23   Head CT w/o contrast    Impression    IMPRESSION:  HEAD CT:  1.  Left parietal scalp contusion. No acute intracranial hemorrhage or calvarial fracture.    CERVICAL SPINE CT:  1.  No acute cervical spine fracture.   Cervical spine CT w/o contrast    Impression    IMPRESSION:  HEAD CT:  1.  Left parietal scalp contusion. No acute intracranial hemorrhage or calvarial fracture.    CERVICAL SPINE CT:  1.  No acute cervical spine fracture.   Chest XR,  PA & LAT    Impression    IMPRESSION: Negative chest.   Extra Blue Top Tube   Result Value Ref Range    Hold Specimen JIC    Extra Red Top Tube   Result Value Ref Range    Hold Specimen JIC    Basic metabolic panel   Result Value Ref Range    Sodium 141 135 - 145 mmol/L    Potassium 3.0 (L) 3.4 - 5.3 mmol/L    Chloride 100 98 - 107 mmol/L    Carbon Dioxide (CO2) 20 (L) 22 - 29 mmol/L    Anion Gap 21 (H) 7 - 15 mmol/L    Urea Nitrogen 19.6 6.0 - 20.0 mg/dL    Creatinine 1.04 0.67 - 1.17 mg/dL    GFR Estimate >90 >60 mL/min/1.73m2    Calcium 9.8 8.6 - 10.0 mg/dL    Glucose 110 (H) 70 - 99 mg/dL   CBC with platelets and differential   Result Value Ref Range    WBC Count 17.3 (H) 4.0 - 11.0 10e3/uL    RBC Count 5.51 4.40 - 5.90 10e6/uL    Hemoglobin 16.3 13.3 - 17.7 g/dL    Hematocrit 47.8 40.0 - 53.0 %    MCV 87 78 - 100 fL    MCH 29.6 26.5 - 33.0 pg    MCHC 34.1 31.5 - 36.5 g/dL    RDW 13.2 10.0 - 15.0 %    Platelet Count 222 150 - 450 10e3/uL    % Neutrophils 66 %    % Lymphocytes 27 %    % Monocytes 5 %    % Eosinophils 1 %    % Basophils 0 %    % Immature Granulocytes 1 %    NRBCs per 100 WBC 0 <1 /100    Absolute Neutrophils 11.4 (H) 1.6 - 8.3 10e3/uL    Absolute Lymphocytes 4.7 0.8 - 5.3 10e3/uL    Absolute Monocytes 0.9 0.0 - 1.3 10e3/uL    Absolute Eosinophils 0.1 0.0 - 0.7 10e3/uL    Absolute Basophils 0.1 0.0 - 0.2 10e3/uL    Absolute Immature  Granulocytes 0.1 <=0.4 10e3/uL    Absolute NRBCs 0.0 10e3/uL   Result Value Ref Range    Troponin T, High Sensitivity 7 <=22 ng/L   Alcohol level blood   Result Value Ref Range    Alcohol ethyl 0.17 (H) <=0.01 g/dL   D dimer quantitative   Result Value Ref Range    D-Dimer Quantitative <0.27 0.00 - 0.50 ug/mL FEU       RADIOLOGY:  Reviewed all pertinent imaging. Please see official radiology report.  Chest XR,  PA & LAT   Final Result   IMPRESSION: Negative chest.      Cervical spine CT w/o contrast   Final Result   IMPRESSION:   HEAD CT:   1.  Left parietal scalp contusion. No acute intracranial hemorrhage or calvarial fracture.      CERVICAL SPINE CT:   1.  No acute cervical spine fracture.      Head CT w/o contrast   Final Result   IMPRESSION:   HEAD CT:   1.  Left parietal scalp contusion. No acute intracranial hemorrhage or calvarial fracture.      CERVICAL SPINE CT:   1.  No acute cervical spine fracture.          EKG:    Performed at: 2144    Impression: Sinus rhythm. No evidence of WPW, LVH, or Brugada syndrome. Possible right ventricular hypertrophy    Rate: 100  Rhythm: Sinus  Axis: rightward  VA Interval: 158  QRS Interval: 92  QTc Interval: 459  ST Changes: None  Comparison: None    I have independently reviewed and interpreted the EKG(s) documented above.    PROCEDURES:   PROCEDURE: Laceration Repair   INDICATIONS: Laceration   PROCEDURE PROVIDER: Dr Judith Aragon   SITE: 2 posterior scalp wounds   TYPE/SIZE: simple, clean, and no foreign body visualized, does not violate the galea  2 cm (total length) and 3 cm   FUNCTIONAL ASSESSMENT: Distal sensation, circulation, and motor intact   MEDICATION: none   PREPARATION: irrigation with Normal saline   DEBRIDEMENT: no debridement   CLOSURE:  Superficial layer closed with 2 staples in upper wound, and 3 staples in lower wound    Total number of sutures/staples placed: 5     PROCEDURE: Laceration Repair   INDICATIONS: Laceration   PROCEDURE PROVIDER:   Judith Aragon   SITE: 2 cm laceration under the chin   TYPE/SIZE: simple, superficial, clean, and no foreign body visualized  2 cm (total length)   FUNCTIONAL ASSESSMENT: Distal sensation, circulation, and motor intact   MEDICATION: 3 mLs of 1% Lidocaine with epinephrine   PREPARATION: irrigation with Normal saline   DEBRIDEMENT: no debridement and wound explored, no foreign body found   CLOSURE:  Superficial layer closed with 3 stitches of 5-0 Prolene simple interrupted    Total number of sutures/staples placed: 3          [facility] Documentation:   CMS Diagnoses:               I, Mateo High, am serving as a scribe to document services personally performed by Judith Aragon MD based on my observation and the provider's statements to me. I, Judith Aragon MD, attest that Mateo High is acting in a scribe capacity, has observed my performance of the services and has documented them in accordance with my direction.    Judith Aragon MD  [facility] EMERGENCY DEPARTMENT  [address] [place] [id number] [phone]     Judith Aragon MD  12/07/23 3074

## 2024-06-15 ENCOUNTER — HEALTH MAINTENANCE LETTER (OUTPATIENT)
Age: 34
End: 2024-06-15

## 2025-07-06 ENCOUNTER — HEALTH MAINTENANCE LETTER (OUTPATIENT)
Age: 35
End: 2025-07-06